# Patient Record
Sex: MALE | Race: WHITE | NOT HISPANIC OR LATINO | Employment: UNEMPLOYED | URBAN - METROPOLITAN AREA
[De-identification: names, ages, dates, MRNs, and addresses within clinical notes are randomized per-mention and may not be internally consistent; named-entity substitution may affect disease eponyms.]

---
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
## 2022-08-15 ENCOUNTER — TELEPHONE (OUTPATIENT)
Dept: URGENT CARE | Facility: CLINIC | Age: 22
End: 2022-08-15

## 2022-08-15 ENCOUNTER — OFFICE VISIT (OUTPATIENT)
Dept: URGENT CARE | Facility: CLINIC | Age: 22
End: 2022-08-15
Payer: COMMERCIAL

## 2022-08-15 VITALS
SYSTOLIC BLOOD PRESSURE: 118 MMHG | WEIGHT: 169 LBS | OXYGEN SATURATION: 98 % | HEART RATE: 74 BPM | BODY MASS INDEX: 22.4 KG/M2 | DIASTOLIC BLOOD PRESSURE: 72 MMHG | RESPIRATION RATE: 20 BRPM | HEIGHT: 73 IN | TEMPERATURE: 99 F

## 2022-08-15 DIAGNOSIS — M53.3 COCCYX PAIN: Primary | ICD-10-CM

## 2022-08-15 PROBLEM — F41.9 ANXIETY DISORDER: Status: ACTIVE | Noted: 2019-09-06

## 2022-08-15 PROCEDURE — 3074F PR MOST RECENT SYSTOLIC BLOOD PRESSURE < 130 MM HG: ICD-10-PCS | Mod: CPTII,S$GLB,, | Performed by: EMERGENCY MEDICINE

## 2022-08-15 PROCEDURE — 3078F PR MOST RECENT DIASTOLIC BLOOD PRESSURE < 80 MM HG: ICD-10-PCS | Mod: CPTII,S$GLB,, | Performed by: EMERGENCY MEDICINE

## 2022-08-15 PROCEDURE — 99213 OFFICE O/P EST LOW 20 MIN: CPT | Mod: 25,S$GLB,, | Performed by: EMERGENCY MEDICINE

## 2022-08-15 PROCEDURE — 3008F BODY MASS INDEX DOCD: CPT | Mod: CPTII,S$GLB,, | Performed by: EMERGENCY MEDICINE

## 2022-08-15 PROCEDURE — 96372 PR INJECTION,THERAP/PROPH/DIAG2ST, IM OR SUBCUT: ICD-10-PCS | Mod: S$GLB,,, | Performed by: EMERGENCY MEDICINE

## 2022-08-15 PROCEDURE — 3008F PR BODY MASS INDEX (BMI) DOCUMENTED: ICD-10-PCS | Mod: CPTII,S$GLB,, | Performed by: EMERGENCY MEDICINE

## 2022-08-15 PROCEDURE — 99213 PR OFFICE/OUTPT VISIT, EST, LEVL III, 20-29 MIN: ICD-10-PCS | Mod: 25,S$GLB,, | Performed by: EMERGENCY MEDICINE

## 2022-08-15 PROCEDURE — 3074F SYST BP LT 130 MM HG: CPT | Mod: CPTII,S$GLB,, | Performed by: EMERGENCY MEDICINE

## 2022-08-15 PROCEDURE — 3078F DIAST BP <80 MM HG: CPT | Mod: CPTII,S$GLB,, | Performed by: EMERGENCY MEDICINE

## 2022-08-15 PROCEDURE — 96372 THER/PROPH/DIAG INJ SC/IM: CPT | Mod: S$GLB,,, | Performed by: EMERGENCY MEDICINE

## 2022-08-15 PROCEDURE — 1159F PR MEDICATION LIST DOCUMENTED IN MEDICAL RECORD: ICD-10-PCS | Mod: CPTII,S$GLB,, | Performed by: EMERGENCY MEDICINE

## 2022-08-15 PROCEDURE — 1159F MED LIST DOCD IN RCRD: CPT | Mod: CPTII,S$GLB,, | Performed by: EMERGENCY MEDICINE

## 2022-08-15 RX ORDER — KETOROLAC TROMETHAMINE 30 MG/ML
30 INJECTION, SOLUTION INTRAMUSCULAR; INTRAVENOUS
Status: COMPLETED | OUTPATIENT
Start: 2022-08-15 | End: 2022-08-15

## 2022-08-15 RX ORDER — KETOROLAC TROMETHAMINE 10 MG/1
10 TABLET, FILM COATED ORAL EVERY 6 HOURS
Qty: 20 TABLET | Refills: 0 | Status: SHIPPED | OUTPATIENT
Start: 2022-08-15 | End: 2022-08-20

## 2022-08-15 RX ADMIN — KETOROLAC TROMETHAMINE 30 MG: 30 INJECTION, SOLUTION INTRAMUSCULAR; INTRAVENOUS at 10:08

## 2022-08-15 NOTE — PATIENT INSTRUCTIONS
Toradol as prescribed.  Take this for the next 5 days as needed for pain.    Apply ice to the tender area for 10 minutes intervals 3 times a day over the next 2 days.    Go to the emergency room for further evaluation if he develops fever and this pain gets worse.

## 2022-08-15 NOTE — TELEPHONE ENCOUNTER
Pt called clinic to confirm that his prescription ketorolac 10mg tablet was sent to his pharmacy, I confirmed that it was.

## 2022-08-15 NOTE — PROGRESS NOTES
"Subjective:       Patient ID: Moises Padilla Jr. is a 22 y.o. male.    Vitals:  height is 6' 1" (1.854 m) and weight is 76.7 kg (169 lb). His oral temperature is 98.8 °F (37.1 °C). His blood pressure is 118/72 and his pulse is 74. His respiration is 20 and oxygen saturation is 98%.     Chief Complaint: Tailbone Pain    Pt present for tailbone pain that started 3 days ago and has gotten worse. Pt denies any injury. Pt states that sitting is painful.  The patient has tried ibuprofen twice without relief of pain.  Patient states that last Sunday he did ride on a new body ache and cycled for a while.  No other new activity.  Denies pain with bowel movement.  No difficulty having bowel movements.  Denies fever    Extremity Weakness   Pain location: tailbone. This is a new problem. The current episode started in the past 7 days. There has been no history of extremity trauma. The problem occurs constantly. The problem has been gradually worsening. The quality of the pain is described as sharp. The pain is at a severity of 8/10. Pertinent negatives include no fever, inability to bear weight, itching, joint locking, joint swelling, limited range of motion, numbness, stiffness or tingling. The symptoms are aggravated by activity, lying down and standing. Treatments tried: advil. The treatment provided no relief. Family history does not include gout or rheumatoid arthritis. There is no history of diabetes, gout, osteoarthritis or rheumatoid arthritis.   Pain  Pertinent negatives include no fever or numbness.       Constitution: Negative for fever.   Musculoskeletal: Positive for pain. Negative for trauma, abnormal ROM of joint, gout and back pain.   Neurological: Negative for numbness.       Objective:      Physical Exam   Constitutional: He is oriented to person, place, and time.   HENT:   Head: Normocephalic and atraumatic.   Pulmonary/Chest: Effort normal.   Abdominal: Normal appearance.   Musculoskeletal:        "  General: Tenderness present. No swelling or deformity.        Back:    Neurological: He is alert and oriented to person, place, and time.      Comments: 5/5 bilateral lower extremity strength   Skin: Skin is warm and dry.   Psychiatric: His behavior is normal.   Nursing note and vitals reviewed.        Assessment:       1. Coccyx pain          Plan:       Patient given a shot of Toradol in clinic.  We will continue oral Toradol.  The patient advised to use ice packs to the area 3 times a day for 10 minutes intervals and go the emergency room if he develops fever or worsening pain.  X-rays were not done today as we do not have an x-ray tech.  Not strictly indicated as patient has had no specific blunt trauma to the area  Coccyx pain  -     ketorolac injection 30 mg

## 2022-08-18 ENCOUNTER — TELEPHONE (OUTPATIENT)
Dept: URGENT CARE | Facility: CLINIC | Age: 22
End: 2022-08-18
Payer: COMMERCIAL

## 2022-08-18 NOTE — TELEPHONE ENCOUNTER
Pt returned courtesy call to clinic.Pt states he is under the care of a physician and being treated for a cyst.

## 2024-07-25 ENCOUNTER — OFFICE VISIT (OUTPATIENT)
Dept: FAMILY MEDICINE | Facility: CLINIC | Age: 24
End: 2024-07-25
Payer: COMMERCIAL

## 2024-07-25 ENCOUNTER — LAB VISIT (OUTPATIENT)
Dept: LAB | Facility: HOSPITAL | Age: 24
End: 2024-07-25
Attending: FAMILY MEDICINE
Payer: COMMERCIAL

## 2024-07-25 VITALS
SYSTOLIC BLOOD PRESSURE: 130 MMHG | OXYGEN SATURATION: 99 % | HEART RATE: 85 BPM | HEIGHT: 73 IN | DIASTOLIC BLOOD PRESSURE: 82 MMHG | TEMPERATURE: 98 F | BODY MASS INDEX: 25.22 KG/M2 | WEIGHT: 190.25 LBS

## 2024-07-25 DIAGNOSIS — K11.20 SIALOADENITIS OF SUBMANDIBULAR GLAND: Primary | ICD-10-CM

## 2024-07-25 DIAGNOSIS — K11.20 SIALOADENITIS OF SUBMANDIBULAR GLAND: ICD-10-CM

## 2024-07-25 LAB
ANION GAP SERPL CALC-SCNC: 8 MMOL/L (ref 8–16)
BUN SERPL-MCNC: 16 MG/DL (ref 6–20)
CALCIUM SERPL-MCNC: 9.7 MG/DL (ref 8.7–10.5)
CHLORIDE SERPL-SCNC: 103 MMOL/L (ref 95–110)
CO2 SERPL-SCNC: 27 MMOL/L (ref 23–29)
CREAT SERPL-MCNC: 1 MG/DL (ref 0.5–1.4)
EST. GFR  (NO RACE VARIABLE): >60 ML/MIN/1.73 M^2
GLUCOSE SERPL-MCNC: 95 MG/DL (ref 70–110)
POTASSIUM SERPL-SCNC: 4 MMOL/L (ref 3.5–5.1)
SODIUM SERPL-SCNC: 138 MMOL/L (ref 136–145)

## 2024-07-25 PROCEDURE — 99999 PR PBB SHADOW E&M-EST. PATIENT-LVL III: CPT | Mod: PBBFAC,,, | Performed by: FAMILY MEDICINE

## 2024-07-25 PROCEDURE — 3008F BODY MASS INDEX DOCD: CPT | Mod: CPTII,S$GLB,, | Performed by: FAMILY MEDICINE

## 2024-07-25 PROCEDURE — 1159F MED LIST DOCD IN RCRD: CPT | Mod: CPTII,S$GLB,, | Performed by: FAMILY MEDICINE

## 2024-07-25 PROCEDURE — 36415 COLL VENOUS BLD VENIPUNCTURE: CPT | Mod: PO | Performed by: FAMILY MEDICINE

## 2024-07-25 PROCEDURE — 80048 BASIC METABOLIC PNL TOTAL CA: CPT | Performed by: FAMILY MEDICINE

## 2024-07-25 PROCEDURE — 3079F DIAST BP 80-89 MM HG: CPT | Mod: CPTII,S$GLB,, | Performed by: FAMILY MEDICINE

## 2024-07-25 PROCEDURE — 3075F SYST BP GE 130 - 139MM HG: CPT | Mod: CPTII,S$GLB,, | Performed by: FAMILY MEDICINE

## 2024-07-25 PROCEDURE — 99204 OFFICE O/P NEW MOD 45 MIN: CPT | Mod: S$GLB,,, | Performed by: FAMILY MEDICINE

## 2024-07-25 RX ORDER — CEPHALEXIN 500 MG/1
500 CAPSULE ORAL EVERY 8 HOURS
Qty: 21 CAPSULE | Refills: 0 | Status: SHIPPED | OUTPATIENT
Start: 2024-07-25

## 2024-07-25 RX ORDER — MELOXICAM 7.5 MG/1
7.5 TABLET ORAL DAILY
Qty: 10 TABLET | Refills: 0 | Status: SHIPPED | OUTPATIENT
Start: 2024-07-25

## 2024-07-25 RX ORDER — FLUOXETINE HYDROCHLORIDE 20 MG/1
20 CAPSULE ORAL DAILY
COMMUNITY

## 2024-07-25 RX ORDER — PROPRANOLOL HYDROCHLORIDE 10 MG/1
TABLET ORAL
COMMUNITY
Start: 2023-09-29

## 2024-07-25 NOTE — PROGRESS NOTES
Chief Complaint:    Chief Complaint   Patient presents with    Mass     Neck - 1 week        History of Present Illness:    Patient presents today    History of Present Illness    Patient presents today with a lump on the neck and sore throat. Patient reports a neck lump and sore throat that started last Friday. He experiences difficulty swallowing and pain when talking. The pain is primarily on one side, corresponding to the location of the lump. He also complains of dry mouth. Associated symptoms include fever, which began on Monday night. Patient denies experiencing cough or body aches during the initial onset of symptoms. He visited urgent care for his current condition and was advised to go to the ER or see a regular doctor. No tests were performed during his urgent care visit. Patient reports being generally healthy with no chronic illnesses or ongoing medical problems. He denies regular medication use. Patient reports a penicillin allergy, which manifested as hives during childhood when he was approximately 1-2 years old. He denies experiencing any respiratory distress or anaphylactic reactions associated with the penicillin allergy.           ROS:  Review of Systems   Constitutional:  Negative for activity change, chills, fatigue, fever and unexpected weight change.   HENT:  Positive for sore throat. Negative for congestion, ear discharge, ear pain, hearing loss, postnasal drip and rhinorrhea.    Eyes:  Negative for pain and visual disturbance.   Respiratory:  Negative for cough, chest tightness and shortness of breath.    Cardiovascular:  Negative for chest pain and palpitations.   Gastrointestinal:  Negative for abdominal pain, diarrhea and vomiting.   Endocrine: Negative for heat intolerance.   Genitourinary:  Negative for dysuria, flank pain, frequency and hematuria.   Musculoskeletal:  Negative for back pain, gait problem and neck pain.   Skin:  Negative for color change and rash.   Neurological:   Negative for dizziness, tremors, seizures, numbness and headaches.   Psychiatric/Behavioral:  Negative for agitation, hallucinations, self-injury, sleep disturbance and suicidal ideas. The patient is not nervous/anxious.        Past Medical History:   Diagnosis Date    Deviated septum        Social History:  Social History     Socioeconomic History    Marital status: Single   Tobacco Use    Smoking status: Never    Smokeless tobacco: Never   Substance and Sexual Activity    Alcohol use: Yes     Alcohol/week: 4.0 standard drinks of alcohol     Types: 4 Cans of beer per week    Drug use: Never    Sexual activity: Yes     Partners: Female     Birth control/protection: Condom     Social Determinants of Health     Financial Resource Strain: Low Risk  (7/24/2024)    Overall Financial Resource Strain (CARDIA)     Difficulty of Paying Living Expenses: Not hard at all   Food Insecurity: No Food Insecurity (7/24/2024)    Hunger Vital Sign     Worried About Running Out of Food in the Last Year: Never true     Ran Out of Food in the Last Year: Never true   Transportation Needs: No Transportation Needs (9/29/2023)    Received from [facility]    PRAPARE - Transportation     Lack of Transportation (Medical): No     Lack of Transportation (Non-Medical): No   Physical Activity: Sufficiently Active (7/24/2024)    Exercise Vital Sign     Days of Exercise per Week: 4 days     Minutes of Exercise per Session: 50 min   Stress: No Stress Concern Present (7/24/2024)    [employer] [place] [employer] - Occupational Stress Questionnaire     Feeling of Stress : Not at all   Housing Stability: Unknown (7/24/2024)    Housing Stability Vital Sign     Unable to Pay for Housing in the Last Year: No       Family History:   family history includes No Known Problems in his brother, father, and mother.    Health Maintenance   Topic Date Due    Hepatitis C Screening   "Never done    Lipid Panel  Never done    HPV Vaccines (3 - Male 3-dose series) 12/17/2020    TETANUS VACCINE  08/14/2023       Exam:Physical     Vital Signs  Temp: 97.9 °F (36.6 °C)  Pulse: 85  SpO2: 99 %  BP: 130/82  Pain Score: 0-No pain  Height and Weight  Height: 6' 1" (185.4 cm)  Weight: 86.3 kg (190 lb 4.1 oz)  BSA (Calculated - sq m): 2.11 sq meters  BMI (Calculated): 25.1  Weight in (lb) to have BMI = 25: 189.1]    Body mass index is 25.1 kg/m².    Physical Exam  Constitutional:       Appearance: He is well-developed.   HENT:      Right Ear: Tympanic membrane and ear canal normal.      Left Ear: Tympanic membrane and ear canal normal.      Mouth/Throat:      Comments: Enlargment of R tonsil, no exudates.  Eyes:      Conjunctiva/sclera: Conjunctivae normal.      Pupils: Pupils are equal, round, and reactive to light.   Neck:        Comments: Large, tender submandibular gland as shown  Cardiovascular:      Rate and Rhythm: Normal rate and regular rhythm.      Heart sounds: Normal heart sounds. No murmur heard.  Pulmonary:      Effort: Pulmonary effort is normal. No respiratory distress.      Breath sounds: Normal breath sounds. No wheezing or rales.   Chest:      Chest wall: No tenderness.   Abdominal:      General: There is no distension.      Palpations: Abdomen is soft. There is no mass.      Tenderness: There is no abdominal tenderness. There is no guarding.   Musculoskeletal:         General: No tenderness.      Cervical back: Normal range of motion and neck supple.   Lymphadenopathy:      Cervical: No cervical adenopathy.   Skin:     General: Skin is warm and dry.   Neurological:      Mental Status: He is alert and oriented to person, place, and time.      Deep Tendon Reflexes: Reflexes are normal and symmetric.   Psychiatric:         Behavior: Behavior normal.         Thought Content: Thought content normal.         Judgment: Judgment normal.           Assessment:      ICD-10-CM ICD-9-CM   1. " Sialoadenitis of submandibular gland  K11.20 527.2         Plan:    Assessment & Plan    MEDICAL DECISION MAKING:  - Right tonsil enlarged with inflamed submandibular gland, likely due to infection tracking from tonsil to gland or isolated gland infection  - Started antibiotics to control infection  - Prescribed medication to decrease pain and inflammation  - Ordered CT neck W Contrast to further evaluate extent of infection and rule out other etiologies, pending kidney function testing  PATIENT EDUCATION:  - Discussed that tonsils act as filters to fight infection and can become enlarged in the process  - Explained that submandibular glands produce saliva and drain into the mouth, and can become infected directly or from nearby structures like the tonsils  - Reviewed that this condition is not contagious  ACTION ITEMS/LIFESTYLE:  - Patient to gargle with salt water  MEDICATIONS:  - Started antibiotic to be taken by mouth 3 times daily for 7 days. Complete full course  - Started meloxicam 1 tablet daily with food to decrease pain and inflammation  ORDERS:  - CT neck W Contrast ordered  - Kidney function testing ordered today prior to CT  FOLLOW UP:  - Follow up if symptoms completely resolve; CT appointment can be cancelled in this case               No follow-ups on file.      Irma Lombardi MD

## 2025-07-11 ENCOUNTER — OFFICE VISIT (OUTPATIENT)
Dept: PRIMARY CARE CLINIC | Facility: CLINIC | Age: 25
End: 2025-07-11
Payer: COMMERCIAL

## 2025-07-11 VITALS
DIASTOLIC BLOOD PRESSURE: 84 MMHG | HEIGHT: 74 IN | HEART RATE: 97 BPM | BODY MASS INDEX: 24.75 KG/M2 | SYSTOLIC BLOOD PRESSURE: 136 MMHG | OXYGEN SATURATION: 99 % | WEIGHT: 192.88 LBS

## 2025-07-11 DIAGNOSIS — F41.1 GAD (GENERALIZED ANXIETY DISORDER): ICD-10-CM

## 2025-07-11 DIAGNOSIS — Z00.00 WELL ADULT EXAM: Primary | ICD-10-CM

## 2025-07-11 DIAGNOSIS — Z23 NEED FOR VACCINATION: ICD-10-CM

## 2025-07-11 DIAGNOSIS — G47.00 INSOMNIA, UNSPECIFIED TYPE: ICD-10-CM

## 2025-07-11 PROCEDURE — 99999 PR PBB SHADOW E&M-EST. PATIENT-LVL III: CPT | Mod: PBBFAC,,, | Performed by: INTERNAL MEDICINE

## 2025-07-11 RX ORDER — ZOLPIDEM TARTRATE 5 MG/1
5 TABLET ORAL NIGHTLY PRN
Qty: 10 TABLET | Refills: 0 | Status: SHIPPED | OUTPATIENT
Start: 2025-07-11 | End: 2026-01-09

## 2025-07-11 RX ORDER — PROPRANOLOL HYDROCHLORIDE 10 MG/1
10 TABLET ORAL 3 TIMES DAILY PRN
Qty: 90 TABLET | Refills: 3 | Status: SHIPPED | OUTPATIENT
Start: 2025-07-11

## 2025-07-11 RX ORDER — ESCITALOPRAM OXALATE 10 MG/1
10 TABLET ORAL DAILY
Qty: 30 TABLET | Refills: 3 | Status: SHIPPED | OUTPATIENT
Start: 2025-07-11 | End: 2026-07-11

## 2025-07-11 NOTE — PROGRESS NOTES
"  [facility] Progress Note  7/11/2025          Reason for Visit:      had concerns including Establish Care and Medication Problem (Study med, new anti anxiety med).       History of Present Illness:     Patient presents today for multiple concerns including vision, travel-related sleep issues, and anxiety.    Insomnia  He reports chronic difficulty falling asleep and has never been prescribed sleep medication. He is interested in exploring pharmacological interventions including Ambien for short-term management during upcoming trip to [facility]    Generalized anxiety disorder  He reports generalized anxiety that affects multiple aspects of daily life, describing anxiety "doing everything." Previously tried fluoxetine 30mg daily approximately 1.5 years ago with minimal effect. He found propranolol helpful for situational anxiety, specifically for public speaking and tests, when taken 30 minutes prior to anxiety-provoking situations. He is not currently on any anxiety medications but expresses interest in finding an appropriate treatment approach.     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 He experiences test-taking anxiety, noting difficulty completing 10-15 questions per section during the ACT exam. Though evaluated as a child for attention issues, he was not diagnosed. He reports current intermittent difficulty maintaining full concentration during daily study sessions, impacting his academic performance.    Leg abrasion  He recently sustained a cut on his leg from a falling grill. The wound shows some surrounding redness but no drainage or discharge.    History of nose/septum surgery.    He drinks alcohol socially, denies drug use, and is a non-smoker.           Problem List:     Problem List[1]      Medications:     Current Medications[2]      Review of Systems:     Review of Systems   Constitutional: " " Negative for chills and fever.   HENT:  Negative for ear pain and sore throat.    Respiratory:  Negative for cough, shortness of breath and wheezing.    Cardiovascular:  Negative for chest pain and palpitations.   Gastrointestinal:  Negative for constipation, diarrhea, nausea and vomiting.   Genitourinary:  Negative for dysuria and hematuria.   Musculoskeletal:  Negative for joint swelling and joint deformity.   Neurological:  Negative for dizziness and weakness.         Physical Exam:     Temp:             Blood Pressure:  136/84        Pulse:   97     Respirations:     Weight:   87.5 kg (192 lb 14.4 oz)  Height:   6' 2" (1.88 m)  BMI:     Body mass index is 24.77 kg/m².    Physical Exam  Constitutional:       General: He is not in acute distress.  HENT:      Right Ear: Tympanic membrane normal.      Left Ear: Tympanic membrane normal.      Nose: No congestion or rhinorrhea.      Mouth/Throat:      Pharynx: No oropharyngeal exudate or posterior oropharyngeal erythema.   Cardiovascular:      Rate and Rhythm: Normal rate and regular rhythm.   Pulmonary:      Effort: Pulmonary effort is normal. No respiratory distress.      Breath sounds: No wheezing.   Abdominal:      Palpations: Abdomen is soft.      Tenderness: There is no abdominal tenderness.   Skin:     General: Skin is warm.   Neurological:      General: No focal deficit present.      Mental Status: He is alert and oriented to person, place, and time.       Labs/Imaging/Testing:     Most recent CBC:  Lab Results   Component Value Date    WBC 7.23 10/21/2021    HGB 15.7 10/21/2021     10/21/2021    MCV 94 10/21/2021       Most recent Chemistry:  Lab Results   Component Value Date     07/25/2024    K 4.0 07/25/2024     07/25/2024    CO2 27 07/25/2024    BUN 16 07/25/2024    CREATININE 1.0 07/25/2024    GLU 95 07/25/2024    CALCIUM 9.7 07/25/2024       Other tests:  Lab Results   Component Value Date    INR 1.1 10/21/2021       Assessment and " Plan:     Visit Summary:  Considered Ambien for short-term use as a sleep aid during 7-day trip to [facility], weighing benefits against risks of tolerance and dependence. Evaluated history of anxiety treatment, including past use of fluoxetine and propranolol. Initiated Lexapro as first-line treatment for generalized anxiety, starting at 10 mg daily. Considered Wellbutrin as potential future option to address focus issues, noting its off-label use for anxiety and FDA approval for ADHD. Assessed minor leg laceration from recent injury, determining topical treatment sufficient unless signs of infection develop. Evaluated BP, finding it slightly elevated but not requiring immediate medication.    - Administered HPV vaccine (3rd dose) and Tdap vaccine in office  - Continue using topical ointment for laceration  - Prescribed propranolol for use as needed before anxiety-provoking events  - Prescribed Lexapro 10 mg daily for generalized anxiety  - Prescribed Ambien (10 tablets) for short-term use during upcoming trip  - Evaluate medication effectiveness after a few weeks         1. Well adult exam    2. Generalized anxiety disorder  - Monitored patient's anxiety during public speaking, tests, and daily activities.  - Evaluated previous medication use, noting fluoxetine was ineffective while propranolol was helpful for anxiety-provoking events.  - Discussed mechanism of action for various anxiety medications, including SSRIs and buspirone.  - Prescribed Lexapro 10 mg daily as first-line treatment for generalized anxiety.  - Explained SSRIs typically take approximately 2 weeks to reach full effect.  - Discussed potential side effects including weight gain, sleep disturbances, dizziness, and sexual dysfunction.  - Instructed to evaluate medication effectiveness after a few weeks.  - Monitored patient's nervousness during public speaking and tests, which impacts focus and performance.  - Prescribed propranolol for use as  needed, 30 minutes before anxiety-provoking events.    2. Need for vaccination  - Educated on HPV vaccine and its role in preventing genital warts and certain cancers.  - Explained difference between 2-dose and 3-dose HPV vaccine schedules based on age at initiation.  - Administered HPV vaccine (3rd dose) and Tdap vaccine in office.     - hpv vaccine,9-dann (GARDASIL 9) vaccine 0.5 mL  - Tdap (BOOSTRIX) vaccine injection 0.5 mL    3. Insomnia, unspecified type  - Monitored patient's trouble falling asleep.  - Patient has not previously been prescribed sleep medication.  - Prescribed Ambien (10 tablets) for short-term use during upcoming trip, with caution about long-term use risks.  - Instructed to avoid mixing Ambien with alcohol due to increased sedation risk and not to drive after taking Ambien.  - Explained potential side effects including nightmares, abnormal dreams, and parasomnia behaviors such as somnambulism.  - zolpidem (AMBIEN) 5 MG Tab; Take 1 tablet (5 mg total) by mouth nightly as needed (insomnia).  Dispense: 10 tablet; Refill: 0     - Monitored leg laceration with some erythema but no purulent discharge or other signs of infection.  - Advised to continue using topical ointment for the laceration.  - Recommend monitoring for signs of infection including spreading erythema, purulent discharge, or pyrexia.    RTC after 3-4 weeks if no improvmeent or worsening of anxiety       Fernando Garcia MD  7/11/2025    This note was prepared using voice-recognition software.  Although efforts are made to proofread the note, some errors may persist in the final document.         [1]   Patient Active Problem List  Diagnosis    Bleeding gums    Anxiety disorder   [2]   Current Outpatient Medications:     EScitalopram oxalate (LEXAPRO) 10 MG tablet, Take 1 tablet (10 mg total) by mouth once daily., Disp: 30 tablet, Rfl: 3    propranoloL (INDERAL) 10 MG tablet, Take 1 tablet (10 mg total) by mouth 3 (three) times daily  as needed (anxiety)., Disp: 90 tablet, Rfl: 3    zolpidem (AMBIEN) 5 MG Tab, Take 1 tablet (5 mg total) by mouth nightly as needed (insomnia)., Disp: 10 tablet, Rfl: 0  No current facility-administered medications for this visit.

## 2025-08-03 NOTE — TELEPHONE ENCOUNTER
No care due was identified.  [facility] Embedded Care Due Messages. Reference number: [id number].   8/03/2025 6:29:44 AM CDT

## 2025-08-04 RX ORDER — ESCITALOPRAM OXALATE 10 MG/1
10 TABLET ORAL
Qty: 90 TABLET | Refills: 2 | Status: SHIPPED | OUTPATIENT
Start: 2025-08-04

## 2025-08-04 NOTE — TELEPHONE ENCOUNTER
Refill Routing Note   Medication(s) are not appropriate for processing by [facility] for the following reason(s):        New or recently adjusted medication    [facility] action(s):  Defer               Appointments  past 12m or future 3m with PCP    Date Provider   Last Visit   7/11/2025 Fernando Garcia MD   Next Visit   Visit date not found Fernando Garcia MD   ED visits in past 90 days: 0        Note composed:11:29 AM 08/04/2025

## 2025-08-25 ENCOUNTER — OFFICE VISIT (OUTPATIENT)
Dept: URGENT CARE | Facility: CLINIC | Age: 25
End: 2025-08-25
Payer: COMMERCIAL

## 2025-08-25 VITALS
OXYGEN SATURATION: 100 % | WEIGHT: 186 LBS | TEMPERATURE: 98 F | BODY MASS INDEX: 23.87 KG/M2 | RESPIRATION RATE: 18 BRPM | SYSTOLIC BLOOD PRESSURE: 130 MMHG | HEIGHT: 74 IN | HEART RATE: 69 BPM | DIASTOLIC BLOOD PRESSURE: 90 MMHG

## 2025-08-25 DIAGNOSIS — L91.0 HYPERTROPHIC SCAR: Primary | ICD-10-CM

## 2025-08-25 PROCEDURE — 99212 OFFICE O/P EST SF 10 MIN: CPT | Mod: S$GLB,,, | Performed by: FAMILY MEDICINE
